# Patient Record
Sex: FEMALE | Race: WHITE | NOT HISPANIC OR LATINO | URBAN - METROPOLITAN AREA
[De-identification: names, ages, dates, MRNs, and addresses within clinical notes are randomized per-mention and may not be internally consistent; named-entity substitution may affect disease eponyms.]

---

## 2018-04-19 ENCOUNTER — APPOINTMENT (RX ONLY)
Dept: URBAN - METROPOLITAN AREA CLINIC 34 | Facility: CLINIC | Age: 68
Setting detail: DERMATOLOGY
End: 2018-04-19

## 2018-04-19 VITALS
DIASTOLIC BLOOD PRESSURE: 69 MMHG | HEIGHT: 65 IN | HEART RATE: 63 BPM | SYSTOLIC BLOOD PRESSURE: 114 MMHG | WEIGHT: 248 LBS

## 2018-04-19 DIAGNOSIS — L72.8 OTHER FOLLICULAR CYSTS OF THE SKIN AND SUBCUTANEOUS TISSUE: ICD-10-CM

## 2018-04-19 DIAGNOSIS — L91.8 OTHER HYPERTROPHIC DISORDERS OF THE SKIN: ICD-10-CM

## 2018-04-19 DIAGNOSIS — L71.8 OTHER ROSACEA: ICD-10-CM

## 2018-04-19 PROBLEM — L85.3 XEROSIS CUTIS: Status: ACTIVE | Noted: 2018-04-19

## 2018-04-19 PROCEDURE — ? COUNSELING

## 2018-04-19 PROCEDURE — 99201: CPT

## 2018-04-19 ASSESSMENT — LOCATION DETAILED DESCRIPTION DERM
LOCATION DETAILED: RIGHT CENTRAL MALAR CHEEK
LOCATION DETAILED: LEFT INFERIOR MEDIAL FOREHEAD
LOCATION DETAILED: RIGHT ANTERIOR AXILLA
LOCATION DETAILED: RIGHT INFERIOR FOREHEAD
LOCATION DETAILED: RIGHT INFERIOR MEDIAL MALAR CHEEK
LOCATION DETAILED: LEFT MENTAL CREASE
LOCATION DETAILED: NASAL SUPRATIP
LOCATION DETAILED: LEFT INFERIOR CENTRAL MALAR CHEEK
LOCATION DETAILED: LEFT INFERIOR LATERAL FOREHEAD
LOCATION DETAILED: NASAL TIP
LOCATION DETAILED: SUBMENTAL CHIN

## 2018-04-19 ASSESSMENT — LOCATION SIMPLE DESCRIPTION DERM
LOCATION SIMPLE: CHIN
LOCATION SIMPLE: LEFT FOREHEAD
LOCATION SIMPLE: RIGHT AXILLA
LOCATION SIMPLE: SUBMENTAL CHIN
LOCATION SIMPLE: RIGHT CHEEK
LOCATION SIMPLE: LEFT CHEEK
LOCATION SIMPLE: NOSE
LOCATION SIMPLE: RIGHT FOREHEAD

## 2018-04-19 ASSESSMENT — LOCATION ZONE DERM
LOCATION ZONE: NOSE
LOCATION ZONE: AXILLAE
LOCATION ZONE: FACE

## 2018-04-19 NOTE — PROCEDURE: COUNSELING
Patient Specific Counseling (Will Not Stick From Patient To Patient): Will schedule on a surgery day for skin tag removal.
Detail Level: Detailed
Patient Specific Counseling (Will Not Stick From Patient To Patient): The patient is given Finacea samples to use once daily in evening. She is advised to discontinue the Metrogel 1%. We will schedule her for skin tag removal at the right trunk, and I&D of the cyst on the nose. The patient understand the risks and benefits and want to proceed.
Patient Specific Counseling (Will Not Stick From Patient To Patient): Will schedule on a surgery day for

## 2018-05-16 ENCOUNTER — APPOINTMENT (RX ONLY)
Dept: URBAN - METROPOLITAN AREA CLINIC 34 | Facility: CLINIC | Age: 68
Setting detail: DERMATOLOGY
End: 2018-05-16

## 2018-05-16 VITALS
WEIGHT: 248 LBS | SYSTOLIC BLOOD PRESSURE: 115 MMHG | HEIGHT: 65 IN | HEART RATE: 69 BPM | DIASTOLIC BLOOD PRESSURE: 75 MMHG

## 2018-05-16 DIAGNOSIS — L71.8 OTHER ROSACEA: ICD-10-CM

## 2018-05-16 PROBLEM — L70.0 ACNE VULGARIS: Status: ACTIVE | Noted: 2018-05-16

## 2018-05-16 PROBLEM — L29.8 OTHER PRURITUS: Status: ACTIVE | Noted: 2018-05-16

## 2018-05-16 PROCEDURE — ? PLAN FOR BMI MANAGEMENT

## 2018-05-16 PROCEDURE — ? PRESCRIPTION

## 2018-05-16 PROCEDURE — 99212 OFFICE O/P EST SF 10 MIN: CPT

## 2018-05-16 PROCEDURE — ? COUNSELING

## 2018-05-16 RX ORDER — AZELAIC ACID 0.15 G/G
SMALL AMOUNT GEL TOPICAL BID
Qty: 1 | Refills: 2 | Status: ERX

## 2018-05-16 ASSESSMENT — LOCATION DETAILED DESCRIPTION DERM
LOCATION DETAILED: LEFT INFERIOR LATERAL FOREHEAD
LOCATION DETAILED: RIGHT INFERIOR FOREHEAD
LOCATION DETAILED: RIGHT CENTRAL MALAR CHEEK

## 2018-05-16 ASSESSMENT — LOCATION SIMPLE DESCRIPTION DERM
LOCATION SIMPLE: RIGHT CHEEK
LOCATION SIMPLE: LEFT FOREHEAD
LOCATION SIMPLE: RIGHT FOREHEAD

## 2018-05-16 ASSESSMENT — LOCATION ZONE DERM: LOCATION ZONE: FACE

## 2018-05-16 NOTE — PROCEDURE: MIPS QUALITY
Quality 130: Documentation Of Current Medications In The Medical Record: Current Medications Documented
Quality 128: Preventive Care And Screening: Body Mass Index (Bmi) Screening And Follow-Up Plan: BMI is documented above normal parameters and a follow-up plan is documented
Detail Level: Detailed

## 2018-06-04 ENCOUNTER — APPOINTMENT (RX ONLY)
Dept: URBAN - METROPOLITAN AREA CLINIC 34 | Facility: CLINIC | Age: 68
Setting detail: DERMATOLOGY
End: 2018-06-04

## 2018-06-04 DIAGNOSIS — L91.8 OTHER HYPERTROPHIC DISORDERS OF THE SKIN: ICD-10-CM

## 2018-06-04 DIAGNOSIS — L72.8 OTHER FOLLICULAR CYSTS OF THE SKIN AND SUBCUTANEOUS TISSUE: ICD-10-CM

## 2018-06-04 PROBLEM — M12.9 ARTHROPATHY, UNSPECIFIED: Status: ACTIVE | Noted: 2018-06-04

## 2018-06-04 PROBLEM — L55.1 SUNBURN OF SECOND DEGREE: Status: ACTIVE | Noted: 2018-06-04

## 2018-06-04 PROCEDURE — 10060 I&D ABSCESS SIMPLE/SINGLE: CPT

## 2018-06-04 PROCEDURE — ? SHAVE REMOVAL (NO PATHOLOGY)

## 2018-06-04 PROCEDURE — ? INCISION AND DRAINAGE

## 2018-06-04 PROCEDURE — 11300 SHAVE SKIN LESION 0.5 CM/<: CPT

## 2018-06-04 ASSESSMENT — LOCATION ZONE DERM
LOCATION ZONE: TRUNK
LOCATION ZONE: NOSE

## 2018-06-04 ASSESSMENT — LOCATION SIMPLE DESCRIPTION DERM
LOCATION SIMPLE: ABDOMEN
LOCATION SIMPLE: NOSE

## 2018-06-04 ASSESSMENT — LOCATION DETAILED DESCRIPTION DERM
LOCATION DETAILED: RIGHT RIB CAGE
LOCATION DETAILED: NASAL TIP

## 2018-06-04 NOTE — PROCEDURE: SHAVE REMOVAL (NO PATHOLOGY)
Hemostasis: Electrocautery
Bill For Surgical Tray: no
Medical Necessity Clause: This procedure was medically necessary because the lesion that was treated was:  inflamed
Detail Level: Detailed
Size Of Lesion In Cm: 0.5
Consent was obtained from the patient. The risks and benefits to therapy were discussed in detail. Specifically, the risks of infection, scarring, bleeding, prolonged wound healing, incomplete removal, allergy to anesthesia, nerve injury and recurrence were addressed. Prior to the procedure, the treatment site was clearly identified and confirmed by the patient. All components of Universal Protocol/PAUSE Rule completed.
Anesthesia Type: 1% lidocaine with epinephrine
Wound Care: Petrolatum
Path Notes (To The Dermatopathologist): Please check margins.
X Size Of Lesion In Cm (Optional): 0
Post-Care Instructions: I reviewed with the patient in detail post-care instructions. Patient is to keep the biopsy site dry overnight, and then apply bacitracin twice daily until healed. Patient may apply hydrogen peroxide soaks to remove any crusting.
Medical Necessity Information: It is in your best interest to select a reason for this procedure from the list below. All of these items fulfill various CMS LCD requirements except the new and changing color options.

## 2018-06-04 NOTE — HPI: SKIN LESION
Is This A New Presentation, Or A Follow-Up?: Skin Lesions
How Severe Is Your Skin Lesion?: moderate
Has Your Skin Lesion Been Treated?: not been treated
Additional History: The patient complains of a cyst on her nose and an irritated skin tag at the right trunk on the bra line

## 2018-06-04 NOTE — PROCEDURE: INCISION AND DRAINAGE
Epidermal Sutures: 4-0 Ethilon
Post-Care Instructions: I reviewed with the patient in detail post-care instructions. Patient should keep wound covered and call the office should any redness, pain, swelling or worsening occur.
Anesthesia Type: 1% lidocaine with epinephrine
Suture Text: The incision was partially closed with
Preparation Text: The area was prepped in the usual clean fashion.
Anesthesia Type: 1% lidocaine without epinephrine and 0.9% sodium chloride in a 1:1 solution
Curette Text (Optional): After the contents were expressed a curette was used to partially remove the cyst wall.
Lesion Type: Cyst
Curette: No
Size Of Lesion In Cm (Optional But May Be Required For Some Insurances): 0
Consent was obtained and risks were reviewed including but not limited to delayed wound healing, infection, need for multiple I and D's, and pain.
Drainage Amount?: minimal
Method: 15 blade
Wound Care: Petrolatum
Anesthesia Volume In Cc: -
Epidermal Closure: simple interrupted
Drainage Type?: cyst-like
Size Of Lesion In Cm (Optional But May Be Required For Some Insurances): 0.3
Dressing: dry sterile dressing
Detail Level: Detailed
Suture Text: The incision was closed with secondary intention

## 2018-08-29 ENCOUNTER — APPOINTMENT (RX ONLY)
Dept: URBAN - METROPOLITAN AREA CLINIC 34 | Facility: CLINIC | Age: 68
Setting detail: DERMATOLOGY
End: 2018-08-29

## 2018-08-29 VITALS
WEIGHT: 248 LBS | HEIGHT: 65 IN | DIASTOLIC BLOOD PRESSURE: 79 MMHG | HEART RATE: 66 BPM | SYSTOLIC BLOOD PRESSURE: 127 MMHG

## 2018-08-29 DIAGNOSIS — L71.8 OTHER ROSACEA: ICD-10-CM

## 2018-08-29 DIAGNOSIS — R03.0 ELEVATED BLOOD-PRESSURE READING, WITHOUT DIAGNOSIS OF HYPERTENSION: ICD-10-CM

## 2018-08-29 PROCEDURE — ? COUNSELING

## 2018-08-29 PROCEDURE — ? RECOMMENDATIONS

## 2018-08-29 PROCEDURE — 99213 OFFICE O/P EST LOW 20 MIN: CPT

## 2018-08-29 ASSESSMENT — LOCATION DETAILED DESCRIPTION DERM
LOCATION DETAILED: RIGHT INFERIOR CENTRAL MALAR CHEEK
LOCATION DETAILED: NASAL SUPRATIP
LOCATION DETAILED: LEFT INFERIOR CENTRAL MALAR CHEEK

## 2018-08-29 ASSESSMENT — LOCATION ZONE DERM
LOCATION ZONE: FACE
LOCATION ZONE: NOSE

## 2018-08-29 ASSESSMENT — LOCATION SIMPLE DESCRIPTION DERM
LOCATION SIMPLE: RIGHT CHEEK
LOCATION SIMPLE: NOSE
LOCATION SIMPLE: LEFT CHEEK

## 2018-08-29 NOTE — PROCEDURE: COUNSELING
Quality 317: Preventative Care And Screening: Screening For High Blood Pressure And Follow-Up Documented: Pre-hypertensive or hypertensive blood pressure reading documented, and the indicated follow-up is documented
Detail Level: Detailed
Patient Specific Counseling (Will Not Stick From Patient To Patient): Recommend continuing with the finacea as it is working well for her.

## 2018-08-29 NOTE — PROCEDURE: RECOMMENDATIONS
Recommendation Preamble: Recommend dietary changes and if BMI remains elevated, follow up with PCP
Detail Level: Zone

## 2022-02-09 ENCOUNTER — APPOINTMENT (RX ONLY)
Dept: URBAN - METROPOLITAN AREA CLINIC 33 | Facility: CLINIC | Age: 72
Setting detail: DERMATOLOGY
End: 2022-02-09

## 2022-02-09 VITALS
DIASTOLIC BLOOD PRESSURE: 86 MMHG | SYSTOLIC BLOOD PRESSURE: 134 MMHG | HEIGHT: 65 IN | RESPIRATION RATE: 16 BRPM | HEART RATE: 64 BPM | WEIGHT: 243 LBS

## 2022-02-09 DIAGNOSIS — D18.0 HEMANGIOMA: ICD-10-CM

## 2022-02-09 DIAGNOSIS — L82.1 OTHER SEBORRHEIC KERATOSIS: ICD-10-CM

## 2022-02-09 DIAGNOSIS — L71.8 OTHER ROSACEA: ICD-10-CM | Status: INADEQUATELY CONTROLLED

## 2022-02-09 DIAGNOSIS — L57.8 OTHER SKIN CHANGES DUE TO CHRONIC EXPOSURE TO NONIONIZING RADIATION: ICD-10-CM

## 2022-02-09 DIAGNOSIS — L85.3 XEROSIS CUTIS: ICD-10-CM

## 2022-02-09 DIAGNOSIS — L91.8 OTHER HYPERTROPHIC DISORDERS OF THE SKIN: ICD-10-CM

## 2022-02-09 DIAGNOSIS — Z23 ENCOUNTER FOR IMMUNIZATION: ICD-10-CM

## 2022-02-09 DIAGNOSIS — D22 MELANOCYTIC NEVI: ICD-10-CM

## 2022-02-09 DIAGNOSIS — R03.0 ELEVATED BLOOD-PRESSURE READING, WITHOUT DIAGNOSIS OF HYPERTENSION: ICD-10-CM

## 2022-02-09 DIAGNOSIS — L81.3 CAFÉ AU LAIT SPOTS: ICD-10-CM

## 2022-02-09 PROBLEM — D22.61 MELANOCYTIC NEVI OF RIGHT UPPER LIMB, INCLUDING SHOULDER: Status: ACTIVE | Noted: 2022-02-09

## 2022-02-09 PROBLEM — D22.5 MELANOCYTIC NEVI OF TRUNK: Status: ACTIVE | Noted: 2022-02-09

## 2022-02-09 PROBLEM — D22.71 MELANOCYTIC NEVI OF RIGHT LOWER LIMB, INCLUDING HIP: Status: ACTIVE | Noted: 2022-02-09

## 2022-02-09 PROBLEM — D22.39 MELANOCYTIC NEVI OF OTHER PARTS OF FACE: Status: ACTIVE | Noted: 2022-02-09

## 2022-02-09 PROBLEM — D18.01 HEMANGIOMA OF SKIN AND SUBCUTANEOUS TISSUE: Status: ACTIVE | Noted: 2022-02-09

## 2022-02-09 PROCEDURE — ? COUNSELING

## 2022-02-09 PROCEDURE — 99203 OFFICE O/P NEW LOW 30 MIN: CPT

## 2022-02-09 PROCEDURE — ? REFERRAL CORRESPONDENCE

## 2022-02-09 PROCEDURE — ? PLAN FOR BMI MANAGEMENT

## 2022-02-09 ASSESSMENT — LOCATION SIMPLE DESCRIPTION DERM
LOCATION SIMPLE: TRAPEZIAL NECK
LOCATION SIMPLE: RIGHT UPPER BACK
LOCATION SIMPLE: RIGHT LOWER BACK
LOCATION SIMPLE: RIGHT CHEEK
LOCATION SIMPLE: LEFT CALF
LOCATION SIMPLE: CHIN
LOCATION SIMPLE: LEFT FOREARM
LOCATION SIMPLE: RIGHT KNEE
LOCATION SIMPLE: LEFT THIGH
LOCATION SIMPLE: LEFT UPPER BACK
LOCATION SIMPLE: UPPER BACK
LOCATION SIMPLE: GLABELLA
LOCATION SIMPLE: LEFT CHEEK
LOCATION SIMPLE: LEFT POPLITEAL SKIN
LOCATION SIMPLE: ABDOMEN
LOCATION SIMPLE: LEFT WRIST
LOCATION SIMPLE: RIGHT POSTERIOR UPPER ARM
LOCATION SIMPLE: RIGHT BUTTOCK
LOCATION SIMPLE: RIGHT CALF
LOCATION SIMPLE: RIGHT UPPER ARM

## 2022-02-09 ASSESSMENT — LOCATION DETAILED DESCRIPTION DERM
LOCATION DETAILED: PERIUMBILICAL SKIN
LOCATION DETAILED: RIGHT SUPERIOR MEDIAL BUCCAL CHEEK
LOCATION DETAILED: LEFT PROXIMAL CALF
LOCATION DETAILED: RIGHT SUPERIOR MEDIAL LOWER BACK
LOCATION DETAILED: LEFT MID-UPPER BACK
LOCATION DETAILED: MID TRAPEZIAL NECK
LOCATION DETAILED: LEFT VENTRAL PROXIMAL FOREARM
LOCATION DETAILED: RIGHT BUTTOCK
LOCATION DETAILED: RIGHT PROXIMAL CALF
LOCATION DETAILED: RIGHT CENTRAL MALAR CHEEK
LOCATION DETAILED: LEFT VENTRAL WRIST
LOCATION DETAILED: GLABELLA
LOCATION DETAILED: LEFT LATERAL ABDOMEN
LOCATION DETAILED: LEFT SUPERIOR UPPER BACK
LOCATION DETAILED: LEFT ANTERIOR DISTAL THIGH
LOCATION DETAILED: RIGHT MID-UPPER BACK
LOCATION DETAILED: LEFT CHIN
LOCATION DETAILED: RIGHT ANTERIOR DISTAL UPPER ARM
LOCATION DETAILED: RIGHT PROXIMAL POSTERIOR UPPER ARM
LOCATION DETAILED: SUPERIOR THORACIC SPINE
LOCATION DETAILED: RIGHT KNEE
LOCATION DETAILED: RIGHT SUPERIOR UPPER BACK
LOCATION DETAILED: LEFT SUPERIOR LATERAL UPPER BACK
LOCATION DETAILED: LEFT POPLITEAL SKIN
LOCATION DETAILED: LEFT INFERIOR UPPER BACK
LOCATION DETAILED: LEFT CENTRAL MALAR CHEEK

## 2022-02-09 ASSESSMENT — LOCATION ZONE DERM
LOCATION ZONE: TRUNK
LOCATION ZONE: NECK
LOCATION ZONE: LEG
LOCATION ZONE: FACE
LOCATION ZONE: ARM

## 2022-02-09 NOTE — PROCEDURE: COUNSELING
Detail Level: Detailed
Quality 317: Preventative Care And Screening: Screening For High Blood Pressure And Follow-Up Documented: Pre-hypertensive or hypertensive blood pressure reading documented, and the indicated follow-up is documented
Quality 110: Preventive Care And Screening: Influenza Immunization: Influenza Immunization not Administered because Patient Refused.
Detail Level: Simple
Topical Retinoids Recommendations: Prescriptive -0.025%- Tretinoin
Sunscreen Recommendations: Recommend SPF 30+ reapply every 2 hours- people tend to like: Neutrogena, Bare Republic, CeraVe, Blue Lizard\\n\\nSun protective clothing - such as that found at [email or web address] and many other retailers
Patient Specific Counseling (Will Not Stick From Patient To Patient): Patient has what presents as a skin tag on the right buttock- she notes having this for a number of years and it can easily get irritated and inflamed.  She would like it removed .Discussed this process and she can set up for removal last appointment of morning or day.  She understands if her insurance doesn't cover she may have some cost associated.
Sunscreen Recommendations: FUV60=30+ reapply every 2 hours when out
Cleanser Recommendations: Over the counter recommendations:\\n1.  Cetaphil Gentle Cleanser\\n2.  Vanicare Body Wash\\n3.  CeraVe Hydrating Body wash\\n4.  Eucerin Skin Calming Dry Skin Body wash\\n5.  Dove Dry Skin Relief Body Wash
Moisturizer Recommendations: Recommend primarily creams in jars:\\n1 . CeraVe Moisturizing Cream\\n2.  Cetaphil Gentle Cleanser\\n3.  Vanicare Cream\\n\\nOintment:\\n1  Vanicare Ointment Lotion\\n2. Eucerin Aquaphor\\n3  Vaseline Petrolatum\\n\\nMinimalist Approach- Shower Lotion\\n1.  Maren In-Shower Body Lotion\\n2.  Eucerin
Cleanser Recommendations: 1.  CeraVe Hydrating Wash\\n2.  Cetaphil Gentle Cleanser\\n3.  Neutrogena Ultra Gentle Face Wash\\n4.  DHC Olive Oil Cleanser (found online [employer]) \\n5.  Dove\\n6.  Vanicare Gentle Facial Cleanser
Sunscreen Recommendations: Mostly recommend any product one is willing to use- quantity and frequency of every 2 hours when out provides optimal results. \\nOver the Counter -some ideas - \\n1.  Neutrogena Products\\n2 . Elta MD\\n3.  Powdered Sunscreens- recommend also checking [employer]\\n    *[employer] Sunforgettable Mineral\\n    *Brush on Block\\n4.  CeraVe
Patient Specific Counseling (Will Not Stick From Patient To Patient): /she notes being on doxycycline however not helping sufficiently.  Reviewed rosacea and discussed plan to manage - she would like to use metronidazole again - so will send in prescription.  \\n\\nTREATMENT:\\n1.  Manage Triggers\\n2  Gentle wash to the face2 x daily - not hot\\n3 . Avoid irritants and triggers\\n4.  Metronidazole 2 x daily entire face good pea size\\n5.  Follow after with CeraVe PM Lotion with niacinamide and/or Niacinamide serum over top \\n6. Sun protect\\n7.  Continue on doxycycline as prescribed by primary care provider. to address inflammatory rosacea.\\n\\nReturn to clinic if concerns
Moisturizer Recommendations: Some recommended moisturizers that have Niacinamide/Anti-inflammatory\\n1.  CeraVe PM Lotion - using it 2 x daily *Niacinamide\\n2.  Niacinamide Serums - [employer] (online) has some niacinamide moisturizers and serums\\n3.  Vanicare Light moisturizer  \\n4.  Neutrogena Gel moisturizers

## 2022-02-17 ENCOUNTER — APPOINTMENT (RX ONLY)
Dept: URBAN - METROPOLITAN AREA CLINIC 33 | Facility: CLINIC | Age: 72
Setting detail: DERMATOLOGY
End: 2022-02-17

## 2022-02-17 VITALS
SYSTOLIC BLOOD PRESSURE: 108 MMHG | HEART RATE: 58 BPM | WEIGHT: 243 LBS | DIASTOLIC BLOOD PRESSURE: 72 MMHG | HEIGHT: 65 IN

## 2022-02-17 DIAGNOSIS — Z23 ENCOUNTER FOR IMMUNIZATION: ICD-10-CM

## 2022-02-17 DIAGNOSIS — L91.8 OTHER HYPERTROPHIC DISORDERS OF THE SKIN: ICD-10-CM

## 2022-02-17 PROCEDURE — ? SKIN TAG REMOVAL

## 2022-02-17 PROCEDURE — 11200 RMVL SKIN TAGS UP TO&INC 15: CPT

## 2022-02-17 PROCEDURE — ? COUNSELING

## 2022-02-17 PROCEDURE — ? PLAN FOR BMI MANAGEMENT

## 2022-02-17 ASSESSMENT — LOCATION ZONE DERM: LOCATION ZONE: TRUNK

## 2022-02-17 ASSESSMENT — LOCATION DETAILED DESCRIPTION DERM: LOCATION DETAILED: RIGHT BUTTOCK

## 2022-02-17 ASSESSMENT — LOCATION SIMPLE DESCRIPTION DERM: LOCATION SIMPLE: RIGHT BUTTOCK

## 2022-02-17 NOTE — PROCEDURE: MIPS QUALITY
Quality 400a: One-Time Screening For Hepatitis C Virus (Hcv) And Treatment Initiation: Patient received one-time screening for HCV infection
Additional Notes: Negative on Review:\\n\\nA Baby [place] (born between [id number]\\nGetting tattoos, body art or body piercing with unsterilized tool\\nGetting blood transfusions or having received blood products before 1992       \\nBeing a healthcare provider that had an accidental needle stick\\nUsing unsterile needles or straws with recreational drugs\\nGetting organ transplants before 1992     \\nBeing a [place] era  \\nHaving long-term dialysis for kidney disease\\nBeing born to a mother with Hep C\\nHaving HIV
Detail Level: Detailed

## 2022-02-17 NOTE — PROCEDURE: SKIN TAG REMOVAL
Detail Level: Detailed
Medical Necessity Information: It is in your best interest to select a reason for this procedure from the list below. All of these items fulfill various CMS LCD requirements except the new and changing color options.
Add Associated Diagnoses If Applicable When Selecting Medical Necessity: Yes
Anesthesia Type: 1% lidocaine with epinephrine
Anesthesia Volume In Cc: 3
Medical Necessity Clause: This procedure was medically necessary because the lesions that were treated were: in line of vision and rubbing on his glasses and pruritic.
Consent: Written consent obtained and the risks of skin tag removal was reviewed with the patient including but not limited to bleeding, pigmentary change, infection, pain, and remote possibility of scarring.